# Patient Record
Sex: FEMALE | Race: ASIAN
[De-identification: names, ages, dates, MRNs, and addresses within clinical notes are randomized per-mention and may not be internally consistent; named-entity substitution may affect disease eponyms.]

---

## 2017-06-07 NOTE — MAMMOGRAPHY REPORT
BILATERAL DIGITAL SCREENING MAMMOGRAM TOMOSYNTHESIS WITH CAD: 6/7/2017

CLINICAL HISTORY: Routine screening.  Patient has no complaints.  





TECHNIQUE:  Breast tomosynthesis in addition to standard 2D mammography was performed. Current study 
was also evaluated with a Computer Aided Detection (CAD) system.  



COMPARISON: Comparison is made to exams dated:  1/2/2015 mammogram, 10/4/2012 mammogram, 10/4/2011 ma
mmogram, 12/24/2009 mammogram, 12/21/2009 mammogram - [facility], and 12/18/2008.  
 



BREAST COMPOSITION:  The tissue of both breasts is heterogeneously dense, which may obscure small mas
ses.  



FINDINGS:  No suspicious masses, calcifications, or areas of architectural distortion are noted in ei
ther breast. There has been no significant interval change compared to prior exams.  





IMPRESSION:  ACR BI-RADS CATEGORY 1: NEGATIVE

There is no mammographic evidence of malignancy. A 1 year screening mammogram is recommended.  The pa
tient will receive written notification of the results.  





Approximately 10% of breast cancers are not detected with mammography. A negative mammographic report
 should not delay biopsy if a clinically suggestive mass is present.



Ladonna Minor M.D.          

/:6/7/2017 15:04:37  



Imaging Technologist: Fanta EUGENE,R, M, [facility]

letter sent: Normal 1/2  

BI-RADS Code: ACR BI-RADS Category 1: Negative

## 2017-06-12 NOTE — DIAGNOSTIC IMAGING REPORT
RIGHT WRIST MRI



HISTORY:      PAIN IN RIGHT WRIST

Right



TECHNIQUE: Multiplanar multisequence MRI of the right wrist was performed both

before and after the intravenous administration of contrast.



COMPARISON STUDY:  Right wrist 6/6/2017.



FINDINGS: No fracture or dislocation within the right wrist. Normal marrow

signal intensity seen throughout the visualized osseous structures. There is

mild motion artifact. The visualized tendons are normal in course, caliber, and

signal intensity. The scapholunate and lunotriquetral ligaments are intact. The

TFCC is also intact. Small subcutaneous cystic focus and edema along the radial

side of the wrist with associated enhancement. The enhancement partially

surrounds the adjacent abductor pollicis longus and extensor pollicis brevis

tendons at the level of the distal radius. The small cystic focus measures

approximately 4 mm. This is best seen on axial image 17 and coronal image 14.



IMPRESSION:  



1. A 4 mm cystic focus within the subcutaneous soft tissues at the radial side

of the wrist deep to the patient's skin marker. This demonstrates mild

surrounding edema/enhancement. This is nonspecific and could represent an

infected sebaceous cyst or ganglion cyst.  

2. No underlying bony abnormality.







Electronically signed by:  Benoit Kelley M.D.

6/12/2017 3:22 PM



Dictated Date/Time:  6/12/2017 3:08 PM

## 2018-02-14 ENCOUNTER — HOSPITAL ENCOUNTER (EMERGENCY)
Dept: HOSPITAL 45 - C.EDB | Age: 63
Discharge: HOME | End: 2018-02-14
Payer: COMMERCIAL

## 2018-02-14 VITALS
WEIGHT: 123.46 LBS | BODY MASS INDEX: 21.08 KG/M2 | HEIGHT: 64.02 IN | WEIGHT: 123.46 LBS | BODY MASS INDEX: 21.08 KG/M2 | HEIGHT: 64.02 IN

## 2018-02-14 VITALS — OXYGEN SATURATION: 98 % | HEART RATE: 68 BPM | DIASTOLIC BLOOD PRESSURE: 72 MMHG | SYSTOLIC BLOOD PRESSURE: 159 MMHG

## 2018-02-14 VITALS — OXYGEN SATURATION: 98 %

## 2018-02-14 VITALS — TEMPERATURE: 97.88 F

## 2018-02-14 DIAGNOSIS — Z82.49: ICD-10-CM

## 2018-02-14 DIAGNOSIS — Z82.3: ICD-10-CM

## 2018-02-14 DIAGNOSIS — R07.9: Primary | ICD-10-CM

## 2018-02-14 LAB
ALBUMIN SERPL-MCNC: 4 GM/DL (ref 3.4–5)
ALP SERPL-CCNC: 40 U/L (ref 45–117)
ALT SERPL-CCNC: 35 U/L (ref 12–78)
AST SERPL-CCNC: 23 U/L (ref 15–37)
BASOPHILS # BLD: 0.05 K/UL (ref 0–0.2)
BASOPHILS NFR BLD: 0.8 %
BUN SERPL-MCNC: 19 MG/DL (ref 7–18)
CALCIUM SERPL-MCNC: 9.3 MG/DL (ref 8.5–10.1)
CK MB SERPL-MCNC: 1.4 NG/ML (ref 0.5–3.6)
CO2 SERPL-SCNC: 26 MMOL/L (ref 21–32)
CREAT SERPL-MCNC: 0.72 MG/DL (ref 0.6–1.2)
EOS ABS #: 0.03 K/UL (ref 0–0.5)
EOSINOPHIL NFR BLD AUTO: 283 K/UL (ref 130–400)
GLUCOSE SERPL-MCNC: 114 MG/DL (ref 70–99)
HCT VFR BLD CALC: 41.1 % (ref 37–47)
HGB BLD-MCNC: 15.1 G/DL (ref 12–16)
IG#: 0.01 K/UL (ref 0–0.02)
IMM GRANULOCYTES NFR BLD AUTO: 24.1 %
LIPASE: 165 U/L (ref 73–393)
LYMPHOCYTES # BLD: 1.43 K/UL (ref 1.2–3.4)
MCH RBC QN AUTO: 32.5 PG (ref 25–34)
MCHC RBC AUTO-ENTMCNC: 36.7 G/DL (ref 32–36)
MCV RBC AUTO: 88.4 FL (ref 80–100)
MONO ABS #: 0.49 K/UL (ref 0.11–0.59)
MONOCYTES NFR BLD: 8.2 %
NEUT ABS #: 3.93 K/UL (ref 1.4–6.5)
NEUTROPHILS # BLD AUTO: 0.5 %
NEUTROPHILS NFR BLD AUTO: 66.2 %
PMV BLD AUTO: 9.3 FL (ref 7.4–10.4)
POTASSIUM SERPL-SCNC: 3.9 MMOL/L (ref 3.5–5.1)
PROT SERPL-MCNC: 7.6 GM/DL (ref 6.4–8.2)
RED CELL DISTRIBUTION WIDTH CV: 12.1 % (ref 11.5–14.5)
RED CELL DISTRIBUTION WIDTH SD: 38.9 FL (ref 36.4–46.3)
SODIUM SERPL-SCNC: 137 MMOL/L (ref 136–145)
WBC # BLD AUTO: 5.94 K/UL (ref 4.8–10.8)

## 2018-02-14 NOTE — DIAGNOSTIC IMAGING REPORT
CHEST ONE VIEW PORTABLE



HISTORY: Atypical  CHEST PAIN



COMPARISON: None.



FINDINGS: The lungs are clear. Cardiac silhouette is normal in size. No pleural

effusions. No pneumothorax.



IMPRESSION:

No acute process.







Electronically signed by:  Benoit Kelley M.D.

2/14/2018 2:22 PM



Dictated Date/Time:  2/14/2018 2:20 PM

## 2018-02-14 NOTE — EMERGENCY ROOM VISIT NOTE
History


Report prepared by Jarad:  Bao Spencer


Under the Supervision of:  Dr. Erick Dinh M.D.


First contact with patient:  13:55


Chief Complaint:  CHEST PAIN


Stated Complaint:  CHEST DISCOMFORT


Nursing Triage Summary:  


 pt was at PT for achilles tendon and started to get right sided chest pain and 


arm pain





pt was told to come to the ED





History of Present Illness


The patient is a 62 year old female who presents to the Emergency Room with 

complaints of intermittent right sided chest pain that began earlier today. She 

rates her pain a 3/10 in severity. The patient has a past medical history of 

posterior tibial tendonitis without any other past medical history. She has a 

family history of stroke and cardiac disease. She regularly ice skates about 

three times a week and is currently receiving physical therapy for her 

tendonitis. This morning after the patient ice skated, she presented to her 

physical therapist as usual. After a normal appointment, she went home and 

began to wash her car. She then suddenly began to feel this chest discomfort 

only with inhalation. She went to her PCP who referred her to the ER for 

further evaluation. Pt denies LOC, headache, fevers, chills, diaphoresis, 

visual changes, neck pain, breathing difficulties, nausea, vomiting, abdominal 

pain, back pain, melena, hematochezia, urinary symptoms, numbness, weakness, 

lymphadenopathy, rash, or other complaints.





   Source of History:  patient


   Onset:  earlier today


   Position:  chest (right)


   Symptom Intensity:  3/10


   Quality:  sharp


   Timing:  intermittent


   Modifying Factors (Worsening):  other (Inhalation)





Review of Systems


See HPI for pertinent positives and negatives.  A total of ten systems were 

reviewed and were otherwise negative.





Past Medical & Surgical


Medical Problems:


(1) Tibialis posterior tendonitis








Family History





Heart disease


Stroke





Social History


Smoking Status:  Never Smoker


Smokeless Tobacco Use:  No


Drug Use:  none


Marital Status:  


Housing Status:  lives with significant other





Current/Historical Medications


No Active Prescriptions or Reported Meds





Allergies


Coded Allergies:  


     Azithromycin (Unverified  Allergy, Intermediate, ., 2/14/18)


     Beta Adrenergic Blockers (Unverified  Allergy, Intermediate, ., 2/14/18)


     Estrogens (Unverified  Allergy, Intermediate, ., 2/14/18)


     Rosuvastatin (Unverified  Allergy, Intermediate, ., 2/14/18)


     Nitrofurantoin (Verified  Allergy, Unknown, ., 2/14/18)


     Penicillins (Verified  Allergy, Unknown, ., 2/14/18)


     Sulfa Antibiotics (Unverified  Allergy, Unknown, "sulfa" allergy; not sure 

if this was to abx or not, 2/14/18)





Physical Exam


Vital Signs











  Date Time  Temp Pulse Resp B/P (MAP) Pulse Ox O2 Delivery O2 Flow Rate FiO2


 


2/14/18 17:59  68 16 159/72 98   


 


2/14/18 16:30  63 16 162/66 96   


 


2/14/18 14:54  61 16 166/74 97 Room Air  


 


2/14/18 14:04     98 Room Air  


 


2/14/18 14:00  86      


 


2/14/18 13:36 36.6 94 16 168/89 97 Room Air  











Physical Exam


GENERAL: Awake, alert, well-appearing, in no distress


HENT: Normocephalic, atraumatic. Oropharynx unremarkable.


EYES: Normal conjunctiva. Sclera non-icteric.


NECK: Supple. No nuchal rigidity. FROM. No masses.


RESPIRATORY: Clear to auscultation. No wheezes.


CARDIAC: Normal rate.  Normal rhythm. No murmurs.  No rubs. Extremities warm 

and well perfused. Pulses equal.  No JVD.


GI: Soft, non-distended. No tenderness to palpation. No rebound or guarding. No 

masses.


RECTAL: Deferred.


MUSCULOSKELETAL: Atraumatic. Chest examination reveals no tenderness. The back 

is symmetrical on inspection without obvious abnormality. There is no CVA 

tenderness to palpation. No joint edema. 


LOWER EXTREMITIES: Calves are equal size bilaterally and non-tender. No edema. 

No discoloration. 


NEURO: Normal sensorium. No sensory or motor deficits noted. 


SKIN: No rash or jaundice noted.





Medical Decision & Procedures


ER Provider


Diagnostic Interpretation:


Radiology results as stated below per my review and radiologist interpretation: 





CHEST ONE VIEW PORTABLE





HISTORY: Atypical  CHEST PAIN





COMPARISON: None.





FINDINGS: The lungs are clear. Cardiac silhouette is normal in size. No pleural


effusions. No pneumothorax.





IMPRESSION:


No acute process.





Electronically signed by:  Benoit Kelley M.D.


2/14/2018 2:22 PM





Dictated Date/Time:  2/14/2018 2:20 PM





Laboratory Results


2/14/18 14:00








Red Blood Count 4.65, Mean Corpuscular Volume 88.4, Mean Corpuscular Hemoglobin 

32.5, Mean Corpuscular Hemoglobin Concent 36.7, Mean Platelet Volume 9.3, 

Neutrophils (%) (Auto) 66.2, Lymphocytes (%) (Auto) 24.1, Monocytes (%) (Auto) 

8.2, Eosinophils (%) (Auto) 0.5, Basophils (%) (Auto) 0.8, Neutrophils # (Auto) 

3.93, Lymphocytes # (Auto) 1.43, Monocytes # (Auto) 0.49, Eosinophils # (Auto) 

0.03, Basophils # (Auto) 0.05





2/14/18 14:00

















Test


  2/14/18


14:00 2/14/18


14:45 2/14/18


16:40


 


White Blood Count


  5.94 K/uL


(4.8-10.8) 


  


 


 


Red Blood Count


  4.65 M/uL


(4.2-5.4) 


  


 


 


Hemoglobin


  15.1 g/dL


(12.0-16.0) 


  


 


 


Hematocrit 41.1 % (37-47)   


 


Mean Corpuscular Volume


  88.4 fL


() 


  


 


 


Mean Corpuscular Hemoglobin


  32.5 pg


(25-34) 


  


 


 


Mean Corpuscular Hemoglobin


Concent 36.7 g/dl


(32-36) 


  


 


 


Platelet Count


  283 K/uL


(130-400) 


  


 


 


Mean Platelet Volume


  9.3 fL


(7.4-10.4) 


  


 


 


Neutrophils (%) (Auto) 66.2 %   


 


Lymphocytes (%) (Auto) 24.1 %   


 


Monocytes (%) (Auto) 8.2 %   


 


Eosinophils (%) (Auto) 0.5 %   


 


Basophils (%) (Auto) 0.8 %   


 


Neutrophils # (Auto)


  3.93 K/uL


(1.4-6.5) 


  


 


 


Lymphocytes # (Auto)


  1.43 K/uL


(1.2-3.4) 


  


 


 


Monocytes # (Auto)


  0.49 K/uL


(0.11-0.59) 


  


 


 


Eosinophils # (Auto)


  0.03 K/uL


(0-0.5) 


  


 


 


Basophils # (Auto)


  0.05 K/uL


(0-0.2) 


  


 


 


RDW Standard Deviation


  38.9 fL


(36.4-46.3) 


  


 


 


RDW Coefficient of Variation


  12.1 %


(11.5-14.5) 


  


 


 


Immature Granulocyte % (Auto) 0.2 %   


 


Immature Granulocyte # (Auto)


  0.01 K/uL


(0.00-0.02) 


  


 


 


Anion Gap


  8.0 mmol/L


(3-11) 


  


 


 


Est Creatinine Clear Calc


Drug Dose 69.1 ml/min 


  


  


 


 


Estimated GFR (


American) 103.3 


  


  


 


 


Estimated GFR (Non-


American 89.1 


  


  


 


 


BUN/Creatinine Ratio 26.4 (10-20)   


 


Calcium Level


  9.3 mg/dl


(8.5-10.1) 


  


 


 


Total Bilirubin


  0.4 mg/dl


(0.2-1) 


  


 


 


Direct Bilirubin


  < 0.1 mg/dl


(0-0.2) 


  


 


 


Aspartate Amino Transf


(AST/SGOT) 23 U/L (15-37) 


  


  


 


 


Alanine Aminotransferase


(ALT/SGPT) 35 U/L (12-78) 


  


  


 


 


Alkaline Phosphatase


  40 U/L


() 


  


 


 


Total Creatine Kinase


  97 U/L


() 


  


 


 


Creatine Kinase MB


  1.4 ng/ml


(0.5-3.6) 


  


 


 


Creatine Kinase MB Ratio 1.4 (0-3.0)   


 


Total Protein


  7.6 gm/dl


(6.4-8.2) 


  


 


 


Albumin


  4.0 gm/dl


(3.4-5.0) 


  


 


 


Lipase


  165 U/L


() 


  


 


 


Bedside D-Dimer


  


  145 ng/mlFEU


(0-450) 


 


 


Troponin I


  


  


  < 0.015 ng/ml


(0-0.045)





Laboratory results reviewed by me





ECG Per My Interpretation


Indication:  chest pain


Rate (beats per minute):  90


Rhythm:  normal sinus


Findings:  no acute ischemic change, no ectopy, other (Right axis deviation)


Change:


Patient's electrocardiogram was interpreted by me.





ED Course


1355: The patient was evaluated in room B8. A complete history and physical 

exam was performed.





1751: I reevaluated the patient. Discussed results and discharge instructions: 

She verbalized understanding and agreement. The patient is ready for discharge.





Medical Decision


Prior records/ancillary studies reviewed.





Triage Nursing notes reviewed and agree them.





Additional history obtained from the family.





The patient's history was concerning for right-sided chest pain.  





Differential diagnosis:


Etiologies such as musculoskeletal, pleurisy, pulmonary embolism, pneumonia, 

pneumothorax,  infections, pericarditis, myocarditis, cardiac ischemia, aortic 

dissection,esophageal rupture, gastrointestinal, as well as others were 

entertained. 





Physical examination:


As above.  Clinically the patient looks well.


 





ER treatment provided:


No medication given.


On reassessment the patient felt better.





Diagnostic interpretation by me:


The electrocardiogram was negative for pathologic change. 





The labs revealed an unremarkable CBC and chemistry panel.  D-dimer negative.  

Troponin negative.  Repeat troponin negative.  LFTs unremarkable.





Imaging studies:


Chest x-ray as above





The patient is doing very well.  She has reproducible pleuritic pain that is on 

the far right side of her chest.  It is very minor.  Her workup so far here is 

negative.  This seems like it may be pleurisy or musculoskeletal.  The patient 

states she is very active and thinks she may have pulled a muscle ice skating.  

I discussed conservative management with her  and they felt comfortable.

  If she worsens in any way she will be back.I gave my usual and customary 

discussion regarding this issue.





By the evaluation outlined above other emergent etiologies such as those listed 

in the differential, as well as others, were deemed relatively unlikely.  





The patient was educated about the findings as listed above.  All questions 

were answered and the patient was pleased with the treatment.  Return 

instructions were outlined and the patient was discharged in stable condition.  





The patient was referred to her PCP for follow-up for a recheck of the current 

condition.





Medication Reconcilliation


Current Medication List:  was personally reviewed by me





Blood Pressure Screening


Patient's blood pressure:  Elevated blood pressure


Blood pressure disposition:  Referred to PCP





Impression





 Primary Impression:  


 Right-sided chest pain





Scribe Attestation


The scribe's documentation has been prepared under my direction and personally 

reviewed by me in its entirety. I confirm that the note above accurately 

reflects all work, treatment, procedures, and medical decision making performed 

by me.





Departure Information


Dispostion


Home / Self-Care





Prescriptions





No Active Prescriptions or Reported Meds





Referrals


Jose Velazquez M.D.





Forms


Call Back Authorization, HOME CARE DOCUMENTATION FORM,                         

                                       IMPORTANT VISIT INFORMATION





Patient Instructions


My [facility]





Additional Instructions





CHEST PAIN INSTRUCTIONS:





Ibuprofen(Motrin, Advil) may be used for fever or pain.  Use 600mg every six 

hours as needed.  Take with food.  Avoid using more than 2400mg in a 24 hour 

period.  Do not use 2400mg per day for more than three consecutive days without 

physician direction.  Prolonged inappropriate use can lead to stomach upset or 

ulcers. 


(AND/OR)


Acetaminophen(Tylenol) may be used for fever or pain.  Use 1000mg every six 

hours as needed.  Avoid using more than 4000mg in a 24 hour period.  





Rest and drink plenty of fluids as tolerated.





Continue current medications.





Avoid strenuous activities and anything that worsens your pain.  Resume normal 

activities once your symptoms resolve.    





Return to the ER immediately for worsening or persistent chest pain, abdominal 

pain, vomiting, fevers, chest pains, difficulty breathing, worsening of your 

condition, or as needed.





Follow up with your primary physician in 2-3 days for a recheck of your current 

condition.